# Patient Record
(demographics unavailable — no encounter records)

---

## 2025-07-08 NOTE — PLAN
[FreeTextEntry1] : Differential diagnosis, work up ,management and evaluation of above mentioned concerns extensively reviewed Extensive review of above mentioned concerns.  All questions and concerns addressed, patient expressed understanding. Encouraged to contact the office with any questions or concerns.

## 2025-07-08 NOTE — PHYSICAL EXAM
[MA] : MA [Appropriately responsive] : appropriately responsive [Alert] : alert [No Acute Distress] : no acute distress [No Lymphadenopathy] : no lymphadenopathy [Soft] : soft [Non-tender] : non-tender [Non-distended] : non-distended [No HSM] : No HSM [No Lesions] : no lesions [No Mass] : no mass [Oriented x3] : oriented x3 [Examination Of The Breasts] : a normal appearance [No Masses] : no breast masses were palpable [Labia Majora] : normal [Labia Minora] : normal [Uterine Prolapse] : uterine prolapse [Normal] : normal [Uterine Adnexae] : normal [FreeTextEntry2] : Sylwia Rosado

## 2025-07-08 NOTE — HISTORY OF PRESENT ILLNESS
[Previously active] : previously active [Men] : men [FreeTextEntry1] : [facility]--EL 6 mm and small 5mm cyst on ovary--see EMR--done 1/28/25--PCP ordered to make sure all ok Pt asympt uterine prolapse since 2014--improved!! P 2 [Mammogramdate] : 2025 [PapSmeardate] : 2018 [BoneDensityDate] : 2025 [ColonoscopyDate] : 2020

## 2025-07-22 NOTE — REASON FOR VISIT
[Follow-Up] : a follow-up evaluation of [Family Member] : family member [Home] : at home, [unfilled] , at the time of the visit. [Other Location: e.g. Home (Enter Location, City,State)___] : at [unfilled] [Telehealth (audio & video)] : This visit was provided via telehealth using real-time 2-way audio visual technology. [Verbal consent obtained from patient] : the patient, [unfilled]

## 2025-07-22 NOTE — HISTORY OF PRESENT ILLNESS
[FreeTextEntry1] : f/u pelvic imaging--did 7/9 at [facility] and in office 7/15--rev results of both Pt asympt

## 2025-07-22 NOTE — HISTORY OF PRESENT ILLNESS
[FreeTextEntry1] : f/u pelvic imaging--did 7/9 at [facility] and in office 7/15--rev results of both Pt asympt